# Patient Record
Sex: MALE | Race: WHITE | Employment: UNEMPLOYED | URBAN - METROPOLITAN AREA
[De-identification: names, ages, dates, MRNs, and addresses within clinical notes are randomized per-mention and may not be internally consistent; named-entity substitution may affect disease eponyms.]

---
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
## 2023-07-19 ENCOUNTER — OFFICE VISIT (OUTPATIENT)
Dept: INTERNAL MEDICINE CLINIC | Age: 23
End: 2023-07-19
Payer: COMMERCIAL

## 2023-07-19 VITALS
OXYGEN SATURATION: 97 % | WEIGHT: 174 LBS | HEART RATE: 92 BPM | DIASTOLIC BLOOD PRESSURE: 76 MMHG | HEIGHT: 69 IN | BODY MASS INDEX: 25.77 KG/M2 | TEMPERATURE: 97.6 F | SYSTOLIC BLOOD PRESSURE: 118 MMHG

## 2023-07-19 DIAGNOSIS — S93.401D SPRAIN OF RIGHT ANKLE, UNSPECIFIED LIGAMENT, SUBSEQUENT ENCOUNTER: ICD-10-CM

## 2023-07-19 DIAGNOSIS — Z76.89 ENCOUNTER TO ESTABLISH CARE: Primary | ICD-10-CM

## 2023-07-19 DIAGNOSIS — Z00.00 PREVENTATIVE HEALTH CARE: ICD-10-CM

## 2023-07-19 DIAGNOSIS — G40.909 SEIZURE DISORDER (HCC): ICD-10-CM

## 2023-07-19 PROBLEM — G43.909 MIGRAINES: Status: ACTIVE | Noted: 2019-05-09

## 2023-07-19 PROBLEM — G25.3 MYOCLONIC JERKING: Status: ACTIVE | Noted: 2019-09-13

## 2023-07-19 PROBLEM — R56.9 SINGLE SEIZURE (HCC): Status: ACTIVE | Noted: 2017-10-26

## 2023-07-19 PROBLEM — G43.009 MIGRAINE WITHOUT AURA AND WITHOUT STATUS MIGRAINOSUS, NOT INTRACTABLE: Status: ACTIVE | Noted: 2017-10-26

## 2023-07-19 PROBLEM — R40.4 EPISODE OF ALTERED CONSCIOUSNESS: Status: ACTIVE | Noted: 2018-09-02

## 2023-07-19 PROCEDURE — 99204 OFFICE O/P NEW MOD 45 MIN: CPT | Performed by: STUDENT IN AN ORGANIZED HEALTH CARE EDUCATION/TRAINING PROGRAM

## 2023-07-19 SDOH — ECONOMIC STABILITY: HOUSING INSECURITY
IN THE LAST 12 MONTHS, WAS THERE A TIME WHEN YOU DID NOT HAVE A STEADY PLACE TO SLEEP OR SLEPT IN A SHELTER (INCLUDING NOW)?: NO

## 2023-07-19 SDOH — ECONOMIC STABILITY: FOOD INSECURITY: WITHIN THE PAST 12 MONTHS, YOU WORRIED THAT YOUR FOOD WOULD RUN OUT BEFORE YOU GOT MONEY TO BUY MORE.: NEVER TRUE

## 2023-07-19 SDOH — ECONOMIC STABILITY: FOOD INSECURITY: WITHIN THE PAST 12 MONTHS, THE FOOD YOU BOUGHT JUST DIDN'T LAST AND YOU DIDN'T HAVE MONEY TO GET MORE.: NEVER TRUE

## 2023-07-19 SDOH — ECONOMIC STABILITY: INCOME INSECURITY: HOW HARD IS IT FOR YOU TO PAY FOR THE VERY BASICS LIKE FOOD, HOUSING, MEDICAL CARE, AND HEATING?: NOT HARD AT ALL

## 2023-07-19 ASSESSMENT — PATIENT HEALTH QUESTIONNAIRE - PHQ9
SUM OF ALL RESPONSES TO PHQ QUESTIONS 1-9: 0
SUM OF ALL RESPONSES TO PHQ QUESTIONS 1-9: 0
2. FEELING DOWN, DEPRESSED OR HOPELESS: 0
1. LITTLE INTEREST OR PLEASURE IN DOING THINGS: 0
SUM OF ALL RESPONSES TO PHQ9 QUESTIONS 1 & 2: 0
SUM OF ALL RESPONSES TO PHQ QUESTIONS 1-9: 0
SUM OF ALL RESPONSES TO PHQ QUESTIONS 1-9: 0

## 2023-07-19 NOTE — PROGRESS NOTES
[facility]  2023    Grace Simmons (:  2000) is a 26 y.o. male, here for evaluation of the following medical concerns:    Chief Complaint   Patient presents with    Established New Doctor    Follow-Up from LAVE[facility]STEAD        ASSESSMENT/ PLAN  1. Encounter to establish care  -Reviewed emergency notes, neurology notes    2. Seizure disorder ([address])  -Continue follow-up with neurology. Noted last seizure about 6 months ago. Currently on Keppra. 3. Sprain of right ankle, unspecified ligament, subsequent encounter  -Noted inversion injury while playing basketball about 2 to 3 days ago. Patient noted he had an x-ray at a local urgent care and was told he did not have any fractures. We do not have the records of his x-rays. On exam he did have tenderness over the lateral malleolus of right foot, no swelling noted. Patient is able to move his ankle with some pain on plantarflexion and dorsiflexion. Will refer to orthopedic surgery per patient's request  - Marielena Friedman MD, Orthopedic Surgery (Foot, Ankle), [facility]    4. Preventative health care  - TSH with Reflex to [facility]; Future  - HIV Screen; Future  - Hepatitis C Antibody; Future  - Lipid Panel; Future  - Comprehensive Metabolic Panel; Future  - CBC with Auto Differential; Future       Return in about 1 year (around 2024) for PHYSICAL. HPI  -Patient is a 37-year-old male with past medical history significant for seizure disorder presenting to establish care with me. He is seeing neurology and is currently on Keppra. Last seizure about 6 months ago. He has been basketball about 2 to 3 days ago when he had a inversion of his right ankle following which he had severe pain. He went to a local urgent care and according to him had an x-ray and was told he did not have a fracture. He has not been able to bear weight on that ankle however has noted the pain has decreased and the swelling has subsided. Wants to see Ortho.   Also needs a

## 2024-07-25 ENCOUNTER — HOSPITAL ENCOUNTER (OUTPATIENT)
Age: 24
Discharge: HOME | End: 2024-07-25
Payer: COMMERCIAL

## 2024-07-25 ENCOUNTER — OFFICE VISIT (OUTPATIENT)
Dept: INTERNAL MEDICINE CLINIC | Age: 24
End: 2024-07-25
Payer: COMMERCIAL

## 2024-07-25 VITALS
WEIGHT: 192 LBS | OXYGEN SATURATION: 97 % | RESPIRATION RATE: 10 BRPM | DIASTOLIC BLOOD PRESSURE: 76 MMHG | HEART RATE: 71 BPM | BODY MASS INDEX: 28.44 KG/M2 | SYSTOLIC BLOOD PRESSURE: 122 MMHG | HEIGHT: 69 IN | TEMPERATURE: 97.8 F

## 2024-07-25 DIAGNOSIS — Z00.00 ENCOUNTER FOR WELL ADULT EXAM WITHOUT ABNORMAL FINDINGS: Primary | ICD-10-CM

## 2024-07-25 DIAGNOSIS — G40.909 SEIZURE DISORDER (HCC): ICD-10-CM

## 2024-07-25 DIAGNOSIS — R73.01 IFG (IMPAIRED FASTING GLUCOSE): ICD-10-CM

## 2024-07-25 DIAGNOSIS — Z00.00 ENCOUNTER FOR WELL ADULT EXAM WITHOUT ABNORMAL FINDINGS: ICD-10-CM

## 2024-07-25 LAB
ALBUMIN SERPL-MCNC: 4.5 G/DL (ref 3.4–5)
ALBUMIN/GLOB SERPL: 1.6 {RATIO} (ref 1.1–2.2)
ALP SERPL-CCNC: 75 U/L (ref 40–129)
ALT SERPL-CCNC: 18 U/L (ref 10–40)
ANION GAP SERPL CALCULATED.3IONS-SCNC: 14 MMOL/L (ref 3–16)
AST SERPL-CCNC: 20 U/L (ref 15–37)
BASOPHILS # BLD: 0.1 K/UL (ref 0–0.2)
BASOPHILS NFR BLD: 1.1 %
BILIRUB SERPL-MCNC: 0.5 MG/DL (ref 0–1)
BUN SERPL-MCNC: 14 MG/DL (ref 7–20)
CALCIUM SERPL-MCNC: 9.7 MG/DL (ref 8.3–10.6)
CHLORIDE SERPL-SCNC: 101 MMOL/L (ref 99–110)
CHOLEST SERPL-MCNC: 169 MG/DL (ref 0–199)
CO2 SERPL-SCNC: 24 MMOL/L (ref 21–32)
CREAT SERPL-MCNC: 0.9 MG/DL (ref 0.9–1.3)
DEPRECATED RDW RBC AUTO: 12.6 % (ref 12.4–15.4)
EOSINOPHIL # BLD: 0.1 K/UL (ref 0–0.6)
EOSINOPHIL NFR BLD: 2 %
EST. AVERAGE GLUCOSE BLD GHB EST-MCNC: 93.9 MG/DL
GFR SERPLBLD CREATININE-BSD FMLA CKD-EPI: >90 ML/MIN/{1.73_M2}
GLUCOSE SERPL-MCNC: 99 MG/DL (ref 70–99)
HBA1C MFR BLD: 4.9 %
HCT VFR BLD AUTO: 42.6 % (ref 40.5–52.5)
HDLC SERPL-MCNC: 52 MG/DL (ref 40–60)
HGB BLD-MCNC: 15 G/DL (ref 13.5–17.5)
LDLC SERPL CALC-MCNC: 96 MG/DL
LYMPHOCYTES # BLD: 1.7 K/UL (ref 1–5.1)
LYMPHOCYTES NFR BLD: 26.8 %
MCH RBC QN AUTO: 30.9 PG (ref 26–34)
MCHC RBC AUTO-ENTMCNC: 35.2 G/DL (ref 31–36)
MCV RBC AUTO: 87.7 FL (ref 80–100)
MONOCYTES # BLD: 0.6 K/UL (ref 0–1.3)
MONOCYTES NFR BLD: 9.1 %
NEUTROPHILS # BLD: 3.8 K/UL (ref 1.7–7.7)
NEUTROPHILS NFR BLD: 61 %
PLATELET # BLD AUTO: 212 K/UL (ref 135–450)
PMV BLD AUTO: 8.5 FL (ref 5–10.5)
POTASSIUM SERPL-SCNC: 4.3 MMOL/L (ref 3.5–5.1)
PROT SERPL-MCNC: 7.3 G/DL (ref 6.4–8.2)
RBC # BLD AUTO: 4.86 M/UL (ref 4.2–5.9)
SODIUM SERPL-SCNC: 139 MMOL/L (ref 136–145)
TRIGL SERPL-MCNC: 106 MG/DL (ref 0–150)
TSH SERPL DL<=0.005 MIU/L-ACNC: 0.87 UIU/ML (ref 0.27–4.2)
VLDLC SERPL CALC-MCNC: 21 MG/DL
WBC # BLD AUTO: 6.3 K/UL (ref 4–11)

## 2024-07-25 PROCEDURE — 83036 HEMOGLOBIN GLYCOSYLATED A1C: CPT

## 2024-07-25 PROCEDURE — 80061 LIPID PANEL: CPT

## 2024-07-25 PROCEDURE — 80053 COMPREHEN METABOLIC PANEL: CPT

## 2024-07-25 PROCEDURE — 84443 ASSAY THYROID STIM HORMONE: CPT

## 2024-07-25 PROCEDURE — 99395 PREV VISIT EST AGE 18-39: CPT | Performed by: STUDENT IN AN ORGANIZED HEALTH CARE EDUCATION/TRAINING PROGRAM

## 2024-07-25 PROCEDURE — 36415 COLL VENOUS BLD VENIPUNCTURE: CPT

## 2024-07-25 PROCEDURE — 85025 COMPLETE CBC W/AUTO DIFF WBC: CPT

## 2024-07-25 NOTE — PROGRESS NOTES
Well Adult Note  Name: Ayush Alarcon Today’s Date: 2024   MRN: [id number] Sex: Male   Age: 24 y.o. Ethnicity: Unavailable / Unknown   : 2000 Race: White (non-)      Ayush Alarcon is here for a well adult exam.       Subjective   History:  Patient is a 24-year-old male with past medical history is significant for seizure disorder presenting for annual physical exam.  He is following up with neurology and is on Keppra.  He has had no breakthrough seizures.  Endorses stable mood.  Denies any chest pain or shortness of breath today.  He is due for labs.    Review of Systems  14 point ROS negative except as above  No Known Allergies  Prior to Visit Medications    Medication Sig Taking? Authorizing Provider   levETIRAcetam (KEPPRA) 500 MG tablet Take 1 tablet by mouth 2 times daily Yes Contreras Croft MD     Past Medical History:   Diagnosis Date    ADHD (attention deficit hyperactivity disorder)     Seizure (HCC)     last episode      Past Surgical History:   Procedure Laterality Date    TONSILLECTOMY AND ADENOIDECTOMY       No family history on file.  Social History     Tobacco Use    Smoking status: Never     Passive exposure: Yes    Smokeless tobacco: Never   Substance Use Topics    Alcohol use: No    Drug use: No           Objective   Vital Signs  /76 (Site: Right Upper Arm, Position: Sitting, Cuff Size: Medium Adult)   Pulse 71   Temp 97.8 °F (36.6 °C) (Temporal)   Resp 10   Ht 1.753 m (5' 9\")   Wt 87.1 kg (192 lb)   SpO2 97%   BMI 28.35 kg/m²     Wt Readings from Last 3 Encounters:   24 87.1 kg (192 lb)   23 78.9 kg (174 lb)   19 68 kg (150 lb) (58 %, Z= 0.20)*     * Growth percentiles are based on CDC (Boys, 2-20 Years) data.       Physical Exam     GENERAL APPEARANCE:  The patient is pleasant and well-appearing, in no acute distress A&Ox3, normal habitus  HEENT:  Normocephalic and atraumatic.  No scleral.  Pupils are equal and round and reactive to light and

## 2025-07-31 ENCOUNTER — OFFICE VISIT (OUTPATIENT)
Dept: INTERNAL MEDICINE CLINIC | Age: 25
End: 2025-07-31
Payer: COMMERCIAL

## 2025-07-31 VITALS
SYSTOLIC BLOOD PRESSURE: 125 MMHG | WEIGHT: 215 LBS | HEART RATE: 70 BPM | HEIGHT: 71 IN | OXYGEN SATURATION: 97 % | DIASTOLIC BLOOD PRESSURE: 71 MMHG | BODY MASS INDEX: 30.1 KG/M2

## 2025-07-31 DIAGNOSIS — G40.909 SEIZURE DISORDER (HCC): ICD-10-CM

## 2025-07-31 DIAGNOSIS — G43.009 MIGRAINE WITHOUT AURA AND WITHOUT STATUS MIGRAINOSUS, NOT INTRACTABLE: ICD-10-CM

## 2025-07-31 DIAGNOSIS — Z00.00 ENCOUNTER FOR WELL ADULT EXAM WITHOUT ABNORMAL FINDINGS: Primary | ICD-10-CM

## 2025-07-31 DIAGNOSIS — Z00.00 ENCOUNTER FOR WELL ADULT EXAM WITHOUT ABNORMAL FINDINGS: ICD-10-CM

## 2025-07-31 PROCEDURE — 99395 PREV VISIT EST AGE 18-39: CPT | Performed by: STUDENT IN AN ORGANIZED HEALTH CARE EDUCATION/TRAINING PROGRAM

## 2025-07-31 SDOH — ECONOMIC STABILITY: FOOD INSECURITY: WITHIN THE PAST 12 MONTHS, THE FOOD YOU BOUGHT JUST DIDN'T LAST AND YOU DIDN'T HAVE MONEY TO GET MORE.: NEVER TRUE

## 2025-07-31 SDOH — ECONOMIC STABILITY: FOOD INSECURITY: WITHIN THE PAST 12 MONTHS, YOU WORRIED THAT YOUR FOOD WOULD RUN OUT BEFORE YOU GOT MONEY TO BUY MORE.: NEVER TRUE

## 2025-07-31 ASSESSMENT — PATIENT HEALTH QUESTIONNAIRE - PHQ9
1. LITTLE INTEREST OR PLEASURE IN DOING THINGS: NOT AT ALL
2. FEELING DOWN, DEPRESSED OR HOPELESS: NOT AT ALL
SUM OF ALL RESPONSES TO PHQ QUESTIONS 1-9: 0

## 2025-07-31 NOTE — PROGRESS NOTES
97.5 kg (215 lb)   SpO2 97%   BMI 29.99 kg/m²     Wt Readings from Last 3 Encounters:   07/31/25 97.5 kg (215 lb)   07/25/24 87.1 kg (192 lb)   07/19/23 78.9 kg (174 lb)       Physical Exam     GENERAL APPEARANCE:  The patient is pleasant and well-appearing, in no acute distress A&Ox3, normal habitus  HEENT:  Normocephalic and atraumatic.  No scleral.  Pupils are equal and round and reactive to light and accomodation.  No conjunctival injection is noted.  Oropharynx is clear.  Mouth revealed is good dentition, no lesions.  Tympanic membranes are intact.  No lymphadenopathy.  LUNGS:  Equal air entry and clear of auscultation bilaterally.  There are not crackles, wheezes or rhonchi noted.  HEART:  Regular rate and rhythm, S1/S2.  No murmurs are noted.  There are no lifts, heaves, or thrills noted on palpation.  ABDOMEN:  Soft, non tender, non distended and no organomegaly.  There are good bowel sounds.  There is no rebounding or guarding.  There is no evidence of hernia.    SKIN:  There are no rashes, lesions, or ulcers noted.  Warm and dry with good turgor.  MUSCULOSKELETAL:  Gait is coordinated and smooth.  There is no clubbing, cyanosis or edema.  B/I pedal pulses are palpable.  NEUROLOGIC:  Cranial nerves II through XII are grossly intact.  Sensation to light touch and pain is intact and bilaterally.     Personalized Preventive Plan  Current Health Maintenance Status  Immunization History   Administered Date(s) Administered    DTaP vaccine [birth year], 12/01/2003, 01/31/2007    HPV, GARDASIL 9, (age 9y-45y), IM, 0.5mL 11/19/2015, 03/31/2016, 09/26/2016    Hep A, HAVRIX, VAQTA, (age 12m-18y), IM, 0.5mL 08/18/2014, 03/02/2015    Hep B, ENGERIX-B, RECOMBIVAX-HB, (age Birth - 19y), IM, 0.5mL 2002    Hep B/Hib (Comvax) 2000, 05/30/2003    Hib PRP-OMP, PEDVAXHIB, (age 2m-6y, Adlt Risk), IM, 0.5mL 2002    Hib vaccine 2002, 05/30/2003    Influenza Virus Vaccine 09/26/2016,

## 2025-08-01 LAB
ALBUMIN SERPL-MCNC: 4.4 G/DL (ref 3.4–5)
ALBUMIN/GLOB SERPL: 1.9 {RATIO} (ref 1.1–2.2)
ALP SERPL-CCNC: 72 U/L (ref 40–129)
ALT SERPL-CCNC: 38 U/L (ref 10–40)
ANION GAP SERPL CALCULATED.3IONS-SCNC: 9 MMOL/L (ref 3–16)
AST SERPL-CCNC: 24 U/L (ref 15–37)
BASOPHILS # BLD: 0 K/UL (ref 0–0.2)
BASOPHILS NFR BLD: 0.7 %
BILIRUB SERPL-MCNC: 0.3 MG/DL (ref 0–1)
BUN SERPL-MCNC: 16 MG/DL (ref 7–20)
CALCIUM SERPL-MCNC: 9.7 MG/DL (ref 8.3–10.6)
CHLORIDE SERPL-SCNC: 107 MMOL/L (ref 99–110)
CHOLEST SERPL-MCNC: 192 MG/DL (ref 0–199)
CO2 SERPL-SCNC: 27 MMOL/L (ref 21–32)
CREAT SERPL-MCNC: 1.1 MG/DL (ref 0.9–1.3)
DEPRECATED RDW RBC AUTO: 12.8 % (ref 12.4–15.4)
EOSINOPHIL # BLD: 0.2 K/UL (ref 0–0.6)
EOSINOPHIL NFR BLD: 2.6 %
GFR SERPLBLD CREATININE-BSD FMLA CKD-EPI: >90 ML/MIN/{1.73_M2}
GLUCOSE SERPL-MCNC: 100 MG/DL (ref 70–99)
HCT VFR BLD AUTO: 43.6 % (ref 40.5–52.5)
HDLC SERPL-MCNC: 37 MG/DL (ref 40–60)
HGB BLD-MCNC: 15.2 G/DL (ref 13.5–17.5)
LDLC SERPL CALC-MCNC: 99 MG/DL
LYMPHOCYTES # BLD: 1.8 K/UL (ref 1–5.1)
LYMPHOCYTES NFR BLD: 26.6 %
MCH RBC QN AUTO: 30.4 PG (ref 26–34)
MCHC RBC AUTO-ENTMCNC: 34.9 G/DL (ref 31–36)
MCV RBC AUTO: 87 FL (ref 80–100)
MONOCYTES # BLD: 0.5 K/UL (ref 0–1.3)
MONOCYTES NFR BLD: 6.8 %
NEUTROPHILS # BLD: 4.2 K/UL (ref 1.7–7.7)
NEUTROPHILS NFR BLD: 63.3 %
PLATELET # BLD AUTO: 252 K/UL (ref 135–450)
PMV BLD AUTO: 8.5 FL (ref 5–10.5)
POTASSIUM SERPL-SCNC: 4.7 MMOL/L (ref 3.5–5.1)
PROT SERPL-MCNC: 6.7 G/DL (ref 6.4–8.2)
RBC # BLD AUTO: 5.01 M/UL (ref 4.2–5.9)
SODIUM SERPL-SCNC: 143 MMOL/L (ref 136–145)
TRIGL SERPL-MCNC: 278 MG/DL (ref 0–150)
TSH SERPL DL<=0.005 MIU/L-ACNC: 0.58 UIU/ML (ref 0.27–4.2)
VLDLC SERPL CALC-MCNC: 56 MG/DL
WBC # BLD AUTO: 6.7 K/UL (ref 4–11)